# Patient Record
Sex: FEMALE | Race: BLACK OR AFRICAN AMERICAN | Employment: FULL TIME | URBAN - METROPOLITAN AREA
[De-identification: names, ages, dates, MRNs, and addresses within clinical notes are randomized per-mention and may not be internally consistent; named-entity substitution may affect disease eponyms.]

---

## 2017-12-10 ENCOUNTER — HOSPITAL ENCOUNTER (EMERGENCY)
Age: 57
Discharge: HOME | End: 2017-12-10
Attending: EMERGENCY MEDICINE
Payer: OTHER MISCELLANEOUS

## 2017-12-10 VITALS
OXYGEN SATURATION: 97 % | DIASTOLIC BLOOD PRESSURE: 100 MMHG | WEIGHT: 225 LBS | TEMPERATURE: 98.1 F | HEIGHT: 63 IN | HEART RATE: 58 BPM | SYSTOLIC BLOOD PRESSURE: 127 MMHG | BODY MASS INDEX: 39.87 KG/M2 | RESPIRATION RATE: 16 BRPM

## 2017-12-10 DIAGNOSIS — M54.41 CHRONIC RIGHT-SIDED LOW BACK PAIN WITH RIGHT-SIDED SCIATICA: Primary | ICD-10-CM

## 2017-12-10 DIAGNOSIS — G89.29 CHRONIC RIGHT-SIDED LOW BACK PAIN WITH RIGHT-SIDED SCIATICA: Primary | ICD-10-CM

## 2017-12-10 PROCEDURE — 96372 THER/PROPH/DIAG INJ SC/IM: CPT

## 2017-12-10 PROCEDURE — 74011250636 HC RX REV CODE- 250/636: Performed by: EMERGENCY MEDICINE

## 2017-12-10 PROCEDURE — 99282 EMERGENCY DEPT VISIT SF MDM: CPT

## 2017-12-10 RX ORDER — METHYLPREDNISOLONE 4 MG/1
TABLET ORAL
Qty: 1 DOSE PACK | Refills: 0 | Status: SHIPPED | OUTPATIENT
Start: 2017-12-10

## 2017-12-10 RX ORDER — HYDROMORPHONE HYDROCHLORIDE 1 MG/ML
1 INJECTION, SOLUTION INTRAMUSCULAR; INTRAVENOUS; SUBCUTANEOUS ONCE
Status: COMPLETED | OUTPATIENT
Start: 2017-12-10 | End: 2017-12-10

## 2017-12-10 RX ORDER — TRAMADOL HYDROCHLORIDE 50 MG/1
50 TABLET ORAL
Qty: 20 TAB | Refills: 0 | Status: SHIPPED | OUTPATIENT
Start: 2017-12-10

## 2017-12-10 RX ORDER — TOPIRAMATE 100 MG/1
TABLET, FILM COATED ORAL 2 TIMES DAILY
COMMUNITY

## 2017-12-10 RX ORDER — LIDOCAINE 50 MG/G
PATCH TOPICAL
Qty: 5 EACH | Refills: 0 | Status: SHIPPED | OUTPATIENT
Start: 2017-12-10

## 2017-12-10 RX ADMIN — HYDROMORPHONE HYDROCHLORIDE 1 MG: 1 INJECTION, SOLUTION INTRAMUSCULAR; INTRAVENOUS; SUBCUTANEOUS at 11:39

## 2017-12-10 NOTE — ED PROVIDER NOTES
HPI Comments: Patient is a 58-year-old black female with a history of chronic low back pain and right-sided sciatica presents to the emergency department with continuation of her chronic symptoms. She denies recent injury. She denies bowel or bladder symptoms. She states that she is scheduled to have spine surgery in the early part of next month. She has run out of her pain medication. She denies urinary symptoms such as dysuria urgency, frequency. She has no fever. Has no other complaints at this time. The history is provided by the patient. Past Medical History:   Diagnosis Date    Migraine        Past Surgical History:   Procedure Laterality Date    HX KNEE ARTHROSCOPY           History reviewed. No pertinent family history. Social History     Social History    Marital status: SINGLE     Spouse name: N/A    Number of children: N/A    Years of education: N/A     Occupational History    Not on file. Social History Main Topics    Smoking status: Never Smoker    Smokeless tobacco: Never Used    Alcohol use No    Drug use: Not on file    Sexual activity: Not on file     Other Topics Concern    Not on file     Social History Narrative    No narrative on file         ALLERGIES: Cyclobenzaprine; Diclofenac; and Nsaids (non-steroidal anti-inflammatory drug)    Review of Systems   Constitutional: Negative. Negative for appetite change, fever and unexpected weight change. HENT: Negative. Negative for ear pain, hearing loss, nosebleeds, rhinorrhea, sore throat and trouble swallowing. Respiratory: Negative. Negative for cough, chest tightness and shortness of breath. Cardiovascular: Negative. Negative for chest pain and palpitations. Gastrointestinal: Negative. Negative for abdominal distention, abdominal pain, blood in stool and vomiting. Endocrine: Negative. Genitourinary: Negative for dysuria and hematuria. Musculoskeletal: Positive for back pain. Negative for myalgias. Skin: Negative. Negative for rash. Allergic/Immunologic: Negative. Neurological: Negative. Negative for dizziness, syncope, weakness and numbness. Hematological: Negative. Psychiatric/Behavioral: Negative. All other systems reviewed and are negative. Vitals:    12/10/17 1102 12/10/17 1207   BP: (!) 133/104 (!) 127/100   Pulse: 61 (!) 58   Resp: 16 16   Temp: 98.1 °F (36.7 °C)    SpO2: 100% 97%   Weight: 102.1 kg (225 lb)    Height: 5' 3\" (1.6 m)             Physical Exam   Constitutional: She is oriented to person, place, and time. She appears well-developed and well-nourished. She appears distressed. HENT:   Head: Normocephalic and atraumatic. Right Ear: External ear normal.   Left Ear: External ear normal.   Nose: Nose normal.   Mouth/Throat: Oropharynx is clear and moist.   Eyes: Conjunctivae and EOM are normal. Pupils are equal, round, and reactive to light. Neck: Normal range of motion. Neck supple. No JVD present. No thyromegaly present. Cardiovascular: Normal rate, regular rhythm, normal heart sounds and intact distal pulses. No murmur heard. Pulmonary/Chest: Effort normal and breath sounds normal. No respiratory distress. She has no wheezes. She has no rales. Abdominal: Soft. Bowel sounds are normal. She exhibits no distension. There is no tenderness. Musculoskeletal: Normal range of motion. She exhibits no edema. Back has mild diffuse lumbar sacral discomfort mostly on the right side. She has painful range of motion on the right side with a positive straight leg raise. Her deep tendon reflexes are preserved and 2+ bilaterally. She has a normal sensory and motor exam in both lower extremities. Distal pulses are 2+. She has a normal gait. Neurological: She is alert and oriented to person, place, and time. No cranial nerve deficit. Skin: Skin is warm and dry. No rash noted. Psychiatric: She has a normal mood and affect.  Her behavior is normal. Thought content normal.   Vitals reviewed. MDM  Number of Diagnoses or Management Options  Chronic right-sided low back pain with right-sided sciatica:   Diagnosis management comments: Assessment and plan acute on chronic back pain. We'll treat with short-term analgesics, steroid pack, Lidoderm patch. She is encouraged to follow up with her spine doctor as scheduled. No other acute processes is suggested at this time.     Risk of Complications, Morbidity, and/or Mortality  Presenting problems: low  Diagnostic procedures: low  Management options: low    Patient Progress  Patient progress: stable    ED Course       Procedures

## 2017-12-10 NOTE — ED TRIAGE NOTES
Patient ambulatory to ED treatment area with steady gait for complaint of \"I have pain in my right leg. It started around 5am today. I was injured in March and I am supposed to have surgery  Next month. I am in a lot of pain now. \" Patient denies injury to leg.

## 2017-12-10 NOTE — DISCHARGE INSTRUCTIONS

## 2017-12-26 ENCOUNTER — HOSPITAL ENCOUNTER (OUTPATIENT)
Dept: MRI IMAGING | Age: 57
Discharge: HOME | End: 2017-12-26
Attending: ORTHOPAEDIC SURGERY
Payer: OTHER MISCELLANEOUS

## 2017-12-26 DIAGNOSIS — M75.102 LEFT ROTATOR CUFF TEAR: ICD-10-CM

## 2017-12-26 PROCEDURE — 73221 MRI JOINT UPR EXTREM W/O DYE: CPT
